# Patient Record
(demographics unavailable — no encounter records)

---

## 2025-06-30 NOTE — DISCUSSION/SUMMARY
[Normal Growth] : growth [Normal Development] : development  [No Elimination Concerns] : elimination [Continue Regimen] : feeding [No Skin Concerns] : skin [Normal Sleep Pattern] : sleep [None] : no medical problems [Anticipatory Guidance Given] : Anticipatory guidance addressed as per the history of present illness section [No Vaccines] : no vaccines needed [No Medications] : ~He/She~ is not on any medications [FreeTextEntry1] :   4 year old F presenting for HCM. Growth and development normal. PE remarkable/unremarkable. Vision screen passed. Immunizations UTD.  - Routine care & anticipatory guidance given - Parent will RTO for vaccines. Counseled for need and compliance for school. - Referred to audiology, dental & optometry for routine screens - RTC for 5 year old HCM and prn  Caretaker expressed understanding of the plan and agrees. All questions were answered.

## 2025-06-30 NOTE — PHYSICAL EXAM

## 2025-06-30 NOTE — HISTORY OF PRESENT ILLNESS
[Father] : father [whole ___ oz/d] : consumes [unfilled] oz of whole cow's milk per day [Fruit] : fruit [Eggs] : eggs [Fish] : fish [___ stools per day] : [unfilled]  stools per day [Firm] : stools are firm consistency [Normal] : Normal [In own bed] : In own bed [Wakes up at night] : Wakes up at night [Brushing teeth] : Brushing teeth [No] : No cigarette smoke exposure [Car seat in back seat] : Car seat in back seat [Carbon Monoxide Detectors] : Carbon monoxide detectors [Smoke Detectors] : Smoke detectors [Supervised outdoor play] : Supervised outdoor play [NO] : No [FreeTextEntry7] : Pt is a 5 yo male here for WCC. [de-identified] : None [FreeTextEntry1] : SDOH Screening Questionnaire   SDOH (Social Determinants of Health) Questionnaire: 1. Housing: Do you worry that in the upcoming months, your family, or child, may not have a safe or stable place to live? no 2. Food security: Within the last 12 months, did the food you bought not last and you did not have money to buy more? no 3. Community: Do you need help getting public benefits like food stamps or WIC? no 4. Transportation: Does your child have chronic medical condition and therefore struggle with transportation to attend medical appointments? no 5. Healthcare Access: Do you need help getting health or dental insurance? no       Result: Negative Screen. No further intervention needed.